# Patient Record
Sex: MALE | Race: AMERICAN INDIAN OR ALASKA NATIVE | URBAN - METROPOLITAN AREA
[De-identification: names, ages, dates, MRNs, and addresses within clinical notes are randomized per-mention and may not be internally consistent; named-entity substitution may affect disease eponyms.]

---

## 2022-09-02 ENCOUNTER — OFFICE VISIT (OUTPATIENT)
Dept: URBAN - METROPOLITAN AREA CLINIC 76 | Facility: CLINIC | Age: 47
End: 2022-09-02
Payer: COMMERCIAL

## 2022-09-02 DIAGNOSIS — Z96.1 PRESENCE OF INTRAOCULAR LENS: ICD-10-CM

## 2022-09-02 DIAGNOSIS — E11.9 TYPE 2 DIABETES MELLITUS W/O COMPLICATION: Primary | ICD-10-CM

## 2022-09-02 DIAGNOSIS — H26.491 OTHER SECONDARY CATARACT, RIGHT EYE: ICD-10-CM

## 2022-09-02 DIAGNOSIS — H25.11 AGE-RELATED NUCLEAR CATARACT, RIGHT EYE: ICD-10-CM

## 2022-09-02 PROCEDURE — 99204 OFFICE O/P NEW MOD 45 MIN: CPT | Performed by: OPTOMETRIST

## 2022-09-02 ASSESSMENT — KERATOMETRY
OD: 42.88
OS: 39.25

## 2022-09-02 ASSESSMENT — INTRAOCULAR PRESSURE
OS: 22
OD: 21

## 2022-09-02 NOTE — IMPRESSION/PLAN
Impression: Age-related nuclear cataract, right eye: H25.11.

-Trace Plan: Observe. No treatment currently recommended as cataracts do not affect the patients day to day life. Patient to monitor for vision changes and if vision significantly worsens, advised to RTC for evaluation.

## 2022-09-02 NOTE — IMPRESSION/PLAN
Impression: Other secondary cataract, right eye: H26.491. Plan: Discussed diagnosis with patient in detail. Laser vs. observation. Family would like to schedule a consult. Will continue to observe condition and/or symptoms. Patient instructed to call if condition gets worse. Will request previous records. Offered [facility] in [place]. Patient's mother does not want to go to [facility] in [place].

## 2022-09-02 NOTE — IMPRESSION/PLAN
Impression: Type 2 diabetes mellitus w/o complication: I28.1.
-order and performed OCT mac, no view OD and flat/normal OS
-No posterior view/retina on right eye.
-Poor view due to cooperation Left eye
-patient mother and sister helped with history Plan: Diabetes type II: no background diabetic retinopathy, no signs of neovascularization noted. Discussed ocular and systemic benefits of blood sugar control. Continue to monitor blood sugar and continue care with PCP. Advised patient to return to clinic immediately if changes to vision are noted. Continue to monitor for changes.